# Patient Record
Sex: FEMALE | Race: BLACK OR AFRICAN AMERICAN | NOT HISPANIC OR LATINO | Employment: STUDENT | URBAN - NONMETROPOLITAN AREA
[De-identification: names, ages, dates, MRNs, and addresses within clinical notes are randomized per-mention and may not be internally consistent; named-entity substitution may affect disease eponyms.]

---
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
## 2024-02-08 ENCOUNTER — APPOINTMENT (OUTPATIENT)
Dept: GENERAL RADIOLOGY | Facility: HOSPITAL | Age: 19
End: 2024-02-08
Payer: COMMERCIAL

## 2024-02-08 ENCOUNTER — HOSPITAL ENCOUNTER (EMERGENCY)
Facility: HOSPITAL | Age: 19
Discharge: HOME | End: 2024-02-08
Attending: FAMILY MEDICINE
Payer: COMMERCIAL

## 2024-02-08 VITALS
OXYGEN SATURATION: 99 % | TEMPERATURE: 98.4 F | BODY MASS INDEX: 32.65 KG/M2 | DIASTOLIC BLOOD PRESSURE: 80 MMHG | HEART RATE: 82 BPM | WEIGHT: 208 LBS | SYSTOLIC BLOOD PRESSURE: 122 MMHG | RESPIRATION RATE: 20 BRPM | HEIGHT: 67 IN

## 2024-02-08 DIAGNOSIS — M24.411 SHOULDER DISLOCATION, RECURRENT, RIGHT: Primary | ICD-10-CM

## 2024-02-08 DIAGNOSIS — R07.89 CHEST WALL PAIN: ICD-10-CM

## 2024-02-08 PROCEDURE — 73020 X-RAY EXAM OF SHOULDER: CPT

## 2024-02-08 PROCEDURE — 25810000003 SODIUM CHLORIDE 0.9 % SOLUTION: Performed by: FAMILY MEDICINE

## 2024-02-08 PROCEDURE — 25010000002 PROPOFOL 10 MG/ML EMULSION: Performed by: FAMILY MEDICINE

## 2024-02-08 PROCEDURE — 99285 EMERGENCY DEPT VISIT HI MDM: CPT

## 2024-02-08 PROCEDURE — 73030 X-RAY EXAM OF SHOULDER: CPT

## 2024-02-08 RX ORDER — PROPOFOL 10 MG/ML
VIAL (ML) INTRAVENOUS
Status: COMPLETED | OUTPATIENT
Start: 2024-02-08 | End: 2024-02-08

## 2024-02-08 RX ORDER — KETAMINE HCL IN NACL, ISO-OSM 100MG/10ML
50 SYRINGE (ML) INJECTION ONCE
Status: DISCONTINUED | OUTPATIENT
Start: 2024-02-08 | End: 2024-02-09 | Stop reason: HOSPADM

## 2024-02-08 RX ORDER — NAPROXEN 500 MG/1
500 TABLET ORAL 2 TIMES DAILY WITH MEALS
Qty: 60 TABLET | Refills: 0 | Status: SHIPPED | OUTPATIENT
Start: 2024-02-08

## 2024-02-08 RX ORDER — KETAMINE HYDROCHLORIDE 100 MG/ML
INJECTION, SOLUTION INTRAMUSCULAR; INTRAVENOUS
Status: COMPLETED | OUTPATIENT
Start: 2024-02-08 | End: 2024-02-08

## 2024-02-08 RX ORDER — PROPOFOL 10 MG/ML
50 VIAL (ML) INTRAVENOUS ONCE
Status: DISCONTINUED | OUTPATIENT
Start: 2024-02-08 | End: 2024-02-09 | Stop reason: HOSPADM

## 2024-02-08 RX ORDER — SODIUM CHLORIDE 9 MG/ML
INJECTION, SOLUTION INTRAVENOUS
Status: COMPLETED | OUTPATIENT
Start: 2024-02-08 | End: 2024-02-08

## 2024-02-08 RX ADMIN — SODIUM CHLORIDE 1000 ML: 900 INJECTION, SOLUTION INTRAVENOUS at 21:00

## 2024-02-08 RX ADMIN — PROPOFOL 30 MG: 10 INJECTION, EMULSION INTRAVENOUS at 21:14

## 2024-02-08 RX ADMIN — PROPOFOL 50 MG: 10 INJECTION, EMULSION INTRAVENOUS at 21:10

## 2024-02-08 RX ADMIN — PROPOFOL 20 MG: 10 INJECTION, EMULSION INTRAVENOUS at 21:19

## 2024-02-08 RX ADMIN — KETAMINE HYDROCHLORIDE 50 MG: 100 INJECTION, SOLUTION, CONCENTRATE INTRAMUSCULAR; INTRAVENOUS at 21:25

## 2024-02-08 NOTE — Clinical Note
[facility] EMERGENCY DEPARTMENT  [address] [place] [address] AVE  [facility] [address]  Phone: [phone]    Kavya Martin was seen and treated in our emergency department on 2/8/2024.  She may return to work on 02/10/2024.         Thank you for choosing [facility].    Abdon Butler Jr., MD

## 2024-02-08 NOTE — Clinical Note
[facility] EMERGENCY DEPARTMENT  [address] [place] [address] AVE  [facility] [address]  Phone: [phone]    Kavya Martin was seen and treated in our emergency department on 2/8/2024.  She may return to work on 02/10/2024.         Thank you for choosing [facility].    Abdon Butler Jr., MD

## 2024-02-09 NOTE — DISCHARGE INSTRUCTIONS
Due to your recurrent right shoulder dislocations would recommend calling orthopedic for a evaluation.  You will likely need an MRI arthrogram of your right shoulder to further assess the internal damage of your shoulder.  I do believe you likely have a labral tear as well as some rotator cuff tears.  This may require surgery or may just require significant physical therapy.  To have this determined please call orthopedics tomorrow to schedule an appointment for future date.

## 2024-02-09 NOTE — ED PROVIDER NOTES
HPI:    18-year-old -American female who presents to the emergency room with a right shoulder pain which feels dislocated.  Patient has dislocated it twice in the past but is usually able to get it relocated back at home.  However this time she was trying to punch at her brother and felt a pop and has been unable to get the shoulder back in place.  Patient states that initially went numb but now she is feeling back in her hand can move her hand and wrist with no difficulties.  Current pain in the right shoulder 8-9 out of 10.      REVIEW OF SYSTEMS  CONSTITUTIONAL:  No complaints of fever, chills,or weakness  EYES:  No complaints of discharge   ENT: No complaints of sore throat or ear pain  CARDIOVASCULAR:  No complaints of chest pain, palpitations, or swelling  RESPIRATORY:  No complaints of cough or shortness of breath  GI:  No complaints of abdominal pain, nausea, vomiting, or diarrhea  MUSCULOSKELETAL: Positive for right shoulder pain with likely dislocation.   SKIN:  No complaints of rash  NEUROLOGIC:  No complaints of headache, focal weakness, or sensory changes  ENDOCRINE:  No complaints of polyuria or polydipsia  LYMPHATIC:  No complaints of swollen glands  GENITOURINARY: No complaints of urinary frequency or hematuria        PAST MEDICAL HISTORY  Past Medical History:   Diagnosis Date    IBS (irritable bowel syndrome)        FAMILY HISTORY  History reviewed. No pertinent family history.    SOCIAL HISTORY  Social History     Socioeconomic History    Marital status: Single   Tobacco Use    Smoking status: Never    Smokeless tobacco: Never   Vaping Use    Vaping Use: Unknown   Substance and Sexual Activity    Alcohol use: Defer    Drug use: Defer    Sexual activity: Defer       IMMUNIZATION HISTORY  Deferred to primary care physician.    SURGICAL HISTORY  History reviewed. No pertinent surgical history.    CURRENT MEDICATIONS    Current Facility-Administered Medications:     ketamine (KETALAR)  "injection, , Intravenous, Code / Trauma / Sedation Medication, Abdon Butler Jr., MD, 50 mg at 02/08/24 2125    ketamine hcl syringe solution prefilled syringe 50 mg, 50 mg, Intravenous, Once, Abdon Butler Jr., MD    Propofol (DIPRIVAN) injection 50 mg, 50 mg, Intravenous, Once, Abdon Butler Jr., MD    Propofol (DIPRIVAN) injection, , Intravenous, Code / Trauma / Sedation Medication, Abdon Butler Jr., MD, 20 mg at 02/08/24 2119    sodium chloride 0.9 % bolus 1,000 mL, 1,000 mL, Intravenous, Once, Abdon Butler Jr., MD    sodium chloride 0.9 % infusion, , Intravenous, Code / Trauma / Sedation Continuous Med, Abdon Butler Jr., MD, 1,000 mL at 02/08/24 2100    Current Outpatient Medications:     naproxen (Naprosyn) 500 MG tablet, Take 1 tablet by mouth 2 (Two) Times a Day With Meals., Disp: 60 tablet, Rfl: 0    cyclobenzaprine (FLEXERIL) 10 MG tablet, Take 1 tablet by mouth 3 (Three) Times a Day As Needed for Muscle Spasms., Disp: 15 tablet, Rfl: 0    polyethylene glycol (MIRALAX) powder, Use one capful in 8 oz juice/water daily, Disp: 225 g, Rfl: 2    ALLERGIES  No Known Allergies    Musculoskeletal exam    VITAL SIGNS:   /90 (BP Location: Left arm, Patient Position: Sitting)   Pulse 102 Comment: Sinus tach  Temp 98.4 °F (36.9 °C) (Oral)   Resp 20   Ht 170.2 cm (67\")   Wt 94.3 kg (208 lb)   LMP  (LMP Unknown)   SpO2 99%   BMI 32.58 kg/m²     Constitutional: Patient is alert and in no distress.  Patient with moderate right shoulder discomfort.    Cardiovascular: S1-S2 regular rate and rhythm. No murmurs rubs or gallops are noted.    Respiratory: Patient is clear to auscultation bilaterally with no wheezing or rhonchi.  Chest wall is no.  There are no external lesions on the chest.  There is no crepitance    Abdomen: Soft, nontender. Bowel sounds are normal in all 4 quadrants. There is no rebound or guarding noted.  There is no abdominal distention or " hepatosplenomegaly.      Musculoskeletal/neurological: Right shoulder: There is right anterior fullness noted at the anterior shoulder joint near the axilla.There is a small step-off noted over the area of the deltoid on the right shoulder.  Full range of motion is noted of the distal wrist and hand.  Sensation also intact.        Integumentary: No acute changes noted    Genitourinary: Patient is voiding appropriately.    Psychiatric: Normal affect and mood      RADIOLOGY/PROCEDURES    Verbal consent was obtained for the reduction of the right shoulder dislocation.  With respiratory and 2 nurses at the bedside and a tech patient was sedated with 100 mcg Diprivan and 50 mg of ketamine.  With traction and countertraction patient had right shoulder dislocation reduced.  Patient tolerated well requesting repeat postreduction right shoulder films.    Procedural Sedation Note  Indication: Right shoulder relocation    Consent: Written consent    Physician Involvement: The attending physician was present and supervising this procedure.    Pre-Sedation Documentation and Exam:     Please see history and physical examination for pre-sedation evaluation.    ASA Classification: 2    Sedation Intent: Moderate sedation for reduction of right shoulder dislocation    Monitoring and Safety: The patient was placed on a cardiac monitor and vital signs, pulse oximetry and level of consciousness were continuously evaluated throughout the procedure. The patient was closely monitored until recovery from the medications was complete and the patient had returned to baseline status. Respiratory therapy was on standby at all times during the procedure.    (The following sections must be completed)  Post-Sedation Vital Signs: was stable no hypoxia   Complications: None    Time at bedside:    25 minutes was spent dedicated to the sedation procedure itself, separate from other, separately described, procedures.     XR Shoulder 1 View Right    Final Result   1. Successful reduction of the glenohumeral joint. There is a Hill-Sachs   deformity.       This report was signed and finalized on 2/8/2024 9:38 PM by Dr. Soto Mullins MD.          XR Shoulder 2+ View Right   Final Result   1.. Anterior-inferior dislocation of the right glenohumeral joint.       This report was signed and finalized on 2/8/2024 8:45 PM by Dr. Soto Mullins MD.                 FUTURE APPOINTMENTS     No future appointments.       Radiological test (reviewed and interpreted by me): 2 view of the right shoulder shows a anterior/inferior dislocation of the right humeral head.  No acute fractures are noted.        COURSE & MEDICAL DECISION MAKING    Patient's partial differential diagnosis can include: Right shoulder dislocation, right shoulder strain, rotator cuff tear, labral tear, and other       Right shoulder dislocation is noted on plain film x-ray will get informed consent for reduction of the right shoulder.    Right shoulder was successfully reduced.  Will refer the patient to orthopedics for outpatient follow-up.      Patient's level of risk: low      CRITICAL CARE    CRITICAL CARE: No    CRITICAL CARE TIME: None      No results found for this or any previous visit (from the past 24 hour(s)).        Also  Old charts were reviewed per [facility] EMR.  Pertinent details are summarized above.  All laboratory, radiologic, and EKG studies that were performed in the Emergency Department were a necessary part of the evaluation needed to exclude unstable or  emergent medical conditions.     Patient was hemodynamically and neurologically stable in the ED.   Pertinent studies were reviewed as above.     The patient received:  Medications   Propofol (DIPRIVAN) injection 50 mg (50 mg Intravenous Not Given 2/8/24 2141)   sodium chloride 0.9 % bolus 1,000 mL (1,000 mL Intravenous Not Given 2/8/24 2141)   Propofol (DIPRIVAN) injection (20 mg Intravenous Given 2/8/24 2119)   ketamine hcl  syringe solution prefilled syringe 50 mg (50 mg Intravenous Not Given 2/8/24 2140)   ketamine (KETALAR) injection (50 mg Intravenous Given 2/8/24 2125)   sodium chloride 0.9 % infusion (1,000 mL Intravenous New Bag 2/8/24 2100)            ED Disposition       ED Disposition   Discharge    Condition   Stable    Comment   --                 Dragon disclaimer:  Part of this note may be an electronic transcription/translation of spoken language to printed text using the Dragon Dictation System.     I have reviewed the patient’s prescription history via a prescription monitoring program.  This information is consistent with my knowledge of the patient’s controlled substance use history.    Patient evaluated during Coronavirus Pandemic. Isolation practices followed according to [facility] policy.    FINAL IMPRESSION   Diagnosis Plan   1. Shoulder dislocation, recurrent, right  [facility] 02.  Shoulder Immobilizer/Sling      2. Chest wall pain  naproxen (Naprosyn) 500 MG tablet            MD Luke Condon Jr, Thomas Mark Jr., MD  02/08/24 8581

## 2025-03-10 ENCOUNTER — APPOINTMENT (OUTPATIENT)
Dept: GENERAL RADIOLOGY | Facility: HOSPITAL | Age: 20
End: 2025-03-10
Payer: COMMERCIAL

## 2025-03-10 PROCEDURE — 73564 X-RAY EXAM KNEE 4 OR MORE: CPT

## 2025-03-10 PROCEDURE — 73630 X-RAY EXAM OF FOOT: CPT

## 2025-05-06 ENCOUNTER — APPOINTMENT (OUTPATIENT)
Dept: GENERAL RADIOLOGY | Facility: HOSPITAL | Age: 20
End: 2025-05-06
Payer: COMMERCIAL

## 2025-05-06 PROCEDURE — 73030 X-RAY EXAM OF SHOULDER: CPT

## 2025-06-02 ENCOUNTER — HOSPITAL ENCOUNTER (EMERGENCY)
Facility: HOSPITAL | Age: 20
Discharge: HOME | End: 2025-06-03
Payer: COMMERCIAL

## 2025-06-02 DIAGNOSIS — S43.014A ANTERIOR SHOULDER DISLOCATION, RIGHT, INITIAL ENCOUNTER: Primary | ICD-10-CM

## 2025-06-02 PROCEDURE — 99285 EMERGENCY DEPT VISIT HI MDM: CPT

## 2025-06-03 ENCOUNTER — APPOINTMENT (OUTPATIENT)
Dept: GENERAL RADIOLOGY | Facility: HOSPITAL | Age: 20
End: 2025-06-03
Payer: COMMERCIAL

## 2025-06-03 VITALS
HEART RATE: 89 BPM | BODY MASS INDEX: 34.36 KG/M2 | WEIGHT: 213.8 LBS | TEMPERATURE: 98.4 F | OXYGEN SATURATION: 99 % | DIASTOLIC BLOOD PRESSURE: 79 MMHG | RESPIRATION RATE: 18 BRPM | SYSTOLIC BLOOD PRESSURE: 115 MMHG | HEIGHT: 66 IN

## 2025-06-03 PROCEDURE — 73030 X-RAY EXAM OF SHOULDER: CPT

## 2025-06-03 RX ORDER — SODIUM CHLORIDE 0.9 % (FLUSH) 0.9 %
10 SYRINGE (ML) INJECTION AS NEEDED
Status: DISCONTINUED | OUTPATIENT
Start: 2025-06-03 | End: 2025-06-03 | Stop reason: HOSPADM

## 2025-06-03 RX ORDER — ETOMIDATE 2 MG/ML
0.3 INJECTION INTRAVENOUS ONCE
Status: COMPLETED | OUTPATIENT
Start: 2025-06-03 | End: 2025-06-03

## 2025-06-03 RX ORDER — KETOROLAC TROMETHAMINE 30 MG/ML
30 INJECTION, SOLUTION INTRAMUSCULAR; INTRAVENOUS ONCE
Status: DISCONTINUED | OUTPATIENT
Start: 2025-06-03 | End: 2025-06-03 | Stop reason: HOSPADM

## 2025-06-03 RX ADMIN — ETOMIDATE 30 MG: 40 INJECTION, SOLUTION INTRAVENOUS at 01:19

## 2025-06-03 NOTE — ED PROVIDER NOTES
Subjective   History of Present Illness  States that earlier this evening she stumbled and attempted to catch herself, and in the process injured her right shoulder.  She has a history of dislocating his right shoulder at least 3 times, and states her symptoms currently feel similar to her past dislocation symptoms.  She notes pain diffusely in the right shoulder.  She is unable to move her right upper extremity secondary to pain.  She request something for pain at this time.  She declines all other symptoms at this time.  She does not see an orthopedic physician for her chronic dislocations.          Review of Systems   Musculoskeletal:  Positive for arthralgias and joint swelling.   All other systems reviewed and are negative.      Past Medical History:   Diagnosis Date    IBS (irritable bowel syndrome)        No Known Allergies    No past surgical history on file.    No family history on file.    Social History     Socioeconomic History    Marital status: Single   Tobacco Use    Smoking status: Every Day     Types: Cigarettes    Smokeless tobacco: Never   Vaping Use    Vaping status: Some Days    Substances: Nicotine   Substance and Sexual Activity    Alcohol use: Yes    Drug use: Defer    Sexual activity: Defer           Objective   Physical Exam  Vitals and nursing note reviewed.   Constitutional:       General: She is not in acute distress.     Appearance: Normal appearance. She is obese. She is not ill-appearing, toxic-appearing or diaphoretic.      Comments: Appears uncomfortable.   HENT:      Head: Normocephalic and atraumatic.   Musculoskeletal:         General: Tenderness and deformity present.      Comments: Tenderness to palpation over the right shoulder, specifically over the superior and anterior aspects.  Range of motion restricted secondary to pain.   Skin:     General: Skin is warm and dry.   Neurological:      Mental Status: She is alert.   Psychiatric:         Mood and Affect: Mood normal.          Behavior: Behavior normal.         Thought Content: Thought content normal.         Judgment: Judgment normal.         Upper Extremity Dislocation    Date/Time: 6/3/2025 1:23 AM    Performed by: Stephan Alan PA-C  Authorized by: Stephan Alan PA-C  Consent: Verbal consent obtained. Written consent obtained  Consent given by: patient  Patient understanding: patient states understanding of the procedure being performed  Patient consent: the patient's understanding of the procedure matches consent given  Procedure consent: procedure consent matches procedure scheduled  Relevant documents: relevant documents present and verified  Test results: test results available and properly labeled  Site marked: the operative site was marked  Imaging studies: imaging studies available  Patient identity confirmed: verbally with patient  Injury location: shoulder  Location details: right shoulder  Injury type: dislocation  Dislocation type: anterior  Hill-Sachs deformity: no  Chronicity: recurrent  Pre-procedure distal perfusion: normal  Pre-procedure neurological function: normal  Pre-procedure range of motion: normal    Sedation:  Patient sedated: yes  Sedatives: etomidate  Sedation start date/time: 6/3/2025 1:20 AM  Sedation end date/time: 6/3/2025 1:24 AM  Vitals: Vital signs were monitored during sedation.    Immobilization: splint  Post-procedure distal perfusion: normal  Post-procedure neurological function: normal  Post-procedure range of motion: normal                 ED Course                                                       Medical Decision Making  States that earlier this evening she stumbled and attempted to catch herself, and in the process injured her right shoulder.  She has a history of dislocating his right shoulder at least 3 times, and states her symptoms currently feel similar to her past dislocation symptoms.  She notes pain diffusely in the right shoulder.  She is unable to move her right upper  extremity secondary to pain.  She request something for pain at this time.  She declines all other symptoms at this time.  She does not see an orthopedic physician for her chronic dislocations.    DDX: Dislocation, fracture, contusion, rotator cuff injury.    Images appear to show evidence of mild dislocation/subluxation. Will attempt to reduce via procedural sedation.    The procedure well.  Repeat imaging shows properly placed humeral head in the glenoid fossa.  Sling applied.  Will discharge with a diagnosis of shoulder dislocation, reduced.    Amount and/or Complexity of Data Reviewed  Labs: ordered.  Radiology: ordered.    Risk  Prescription drug management.        Final diagnoses:   Anterior shoulder dislocation, right, initial encounter       ED Disposition  ED Disposition       ED Disposition   Discharge    Condition   Stable    Comment   --               Provider, No Known  [facility] [place] [id number] [phone]    In 1 week  As needed         Medication List      No changes were made to your prescriptions during this visit.            Stephan Alan PA-C  06/03/25 0147

## 2025-06-09 ENCOUNTER — OFFICE VISIT (OUTPATIENT)
Dept: INTERNAL MEDICINE | Facility: CLINIC | Age: 20
End: 2025-06-09
Payer: COMMERCIAL

## 2025-06-09 VITALS
HEART RATE: 102 BPM | DIASTOLIC BLOOD PRESSURE: 75 MMHG | WEIGHT: 217 LBS | OXYGEN SATURATION: 99 % | HEIGHT: 66 IN | RESPIRATION RATE: 16 BRPM | SYSTOLIC BLOOD PRESSURE: 102 MMHG | TEMPERATURE: 96.3 F | BODY MASS INDEX: 34.87 KG/M2

## 2025-06-09 DIAGNOSIS — S43.014A ANTERIOR DISLOCATION OF RIGHT SHOULDER, INITIAL ENCOUNTER: ICD-10-CM

## 2025-06-09 DIAGNOSIS — M24.411 SHOULDER DISLOCATION, RECURRENT, RIGHT: Primary | ICD-10-CM

## 2025-06-09 PROBLEM — E66.9 OBESITY (BMI 35.0-39.9 WITHOUT COMORBIDITY): Status: ACTIVE | Noted: 2025-06-09

## 2025-06-09 PROCEDURE — 1159F MED LIST DOCD IN RCRD: CPT | Performed by: INTERNAL MEDICINE

## 2025-06-09 PROCEDURE — 99213 OFFICE O/P EST LOW 20 MIN: CPT | Performed by: INTERNAL MEDICINE

## 2025-06-09 PROCEDURE — 1160F RVW MEDS BY RX/DR IN RCRD: CPT | Performed by: INTERNAL MEDICINE

## 2025-06-09 RX ORDER — IBUPROFEN 800 MG/1
800 TABLET, FILM COATED ORAL EVERY 6 HOURS PRN
Qty: 90 TABLET | Refills: 2 | Status: SHIPPED | OUTPATIENT
Start: 2025-06-09

## 2025-06-09 NOTE — PROGRESS NOTES
Subjective     Chief Complaint   Patient presents with    Establish Care           Shoulder Injury     right       Shoulder Injury       History of Present Illness  The patient presents for evaluation of shoulder dislocation.    She reports a recurrent issue with her shoulder, which has dislocated four times over the past year. The initial dislocation occurred during a boxing match, where she managed to reposition it herself with the help of friends. Subsequent dislocations also happened during boxing activities, with the second incident requiring an emergency room visit. At that time, she was advised to refrain from further boxing. The third dislocation was severe enough to necessitate sedation for repositioning, and she was informed that surgical intervention might be necessary. The fourth dislocation occurred recently, and she was advised to wear a sling for a week. She has not been wearing the sling for the past two days.    Currently, she is experiencing pain in her shoulder. She does not recall any specific injury to her shoulder prior to these incidents. 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 She is eager to return to work and has been told she can do so on Wednesday if her arm condition improves. She typically uses her left hand for tasks at work and has not experienced any dislocations while working.    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 She lives with her grandmother.      Past Medical History:   Past Medical History:   Diagnosis Date    GERD (gastroesophageal reflux disease)     IBS (irritable bowel syndrome)      Past Surgical History:History reviewed. No pertinent surgical history.  Social History:  reports that she has been smoking cigarettes. She has never used smokeless tobacco. She reports current alcohol use. Drug use questions deferred to the  "physician.    Family History: family history is not on file.      Allergies:  No Known Allergies  Medications:  Prior to Admission medications    Medication Sig Start Date End Date Taking? Authorizing Provider   ibuprofen (ADVIL,MOTRIN) 800 MG tablet Take 1 tablet by mouth 3 (Three) Times a Day With Meals. 3/10/25   Diana Connell APRN   ibuprofen (ADVIL,MOTRIN) 800 MG tablet Take 1 tablet by mouth Every 6 (Six) Hours As Needed for Mild Pain. 6/9/25   Ray Marcelino MD           Review of systems   negative unless otherwise specified above in HPI    Objective     Vital Signs: /75 (BP Location: Left arm, Patient Position: Sitting, Cuff Size: Other (Comment))   Pulse 102   Temp 96.3 °F (35.7 °C) (Temporal)   Resp 16   Ht 167.6 cm (66\")   Wt 98.4 kg (217 lb)   SpO2 99%   BMI 35.02 kg/m²     Physical Exam  Vitals reviewed.   Constitutional:       Appearance: Normal appearance. She is obese.   HENT:      Head: Normocephalic and atraumatic.   Cardiovascular:      Rate and Rhythm: Normal rate and regular rhythm.      Heart sounds: Normal heart sounds.   Pulmonary:      Effort: Pulmonary effort is normal.      Breath sounds: Normal breath sounds.   Abdominal:      General: Bowel sounds are normal.      Palpations: Abdomen is soft.   Musculoskeletal:      Comments: I was careful not to stress her shoulder, in general I think the exam is normal   Neurological:      Mental Status: She is alert.       Physical Exam  Respiratory: Clear to auscultation, no wheezing, rales or rhonchi  Musculoskeletal: Right shoulder shows signs of discomfort with limited range of motion; patient reports pain and a feeling of instability    Class 2 Severe Obesity (BMI >=35 and <=39.9). Obesity-related health conditions include the following: none. Obesity is newly identified. BMI is is above average; BMI management plan is completed. We discussed portion control and increasing exercise.        Results " Reviewed:  Results          Assessment / Plan     Assessment/Plan:   Diagnosis Plan   1. Shoulder dislocation, recurrent, right  Ambulatory Referral to Physical Therapy for Evaluation & Treatment    Ambulatory Referral to Orthopedic Surgery    MRI Shoulder Right Without Contrast    ibuprofen (ADVIL,MOTRIN) 800 MG tablet      2. Anterior dislocation of right shoulder, initial encounter            Assessment & Plan  1. Shoulder dislocation.  - She has experienced recurrent shoulder dislocations, with 4 episodes in the past year.  - The first 3 incidents occurred during boxing activities, and the most recent required sedation for reduction.  - An MRI of the shoulder will be ordered to assess the extent of the injury. A referral to physical therapy will be made to initiate treatment.  - Additionally, a referral to an orthopedic surgeon will be arranged for further evaluation and management, including the potential need for surgical intervention. She is advised to avoid stretching her arm, pulling on objects, and lifting heavy items with the affected arm. The use of a sling is recommended for support and to provide rest to the shoulder.      Return in about 4 weeks (around 7/7/2025). unless patient needs to be seen sooner or acute issues arise.      I have discussed the patient results/orders and and plan/recommendation with them at today's visit.      Signed by:    Dr. Ray Marcelino Date: 06/09/25    EMR Dictation/Transcription disclaimer:   Some of this note may be an electronic transcription/translation of spoken language to printed text. The electronic translation of spoken language may permit erroneous, or at times, nonsensical words or phrases to be inadvertently transcribed; Although I have reviewed the note for such errors, some may still exist.      Patient or patient representative verbalized consent for the use of Ambient Listening during the visit with  Ray Marcelino MD for chart documentation. 6/9/2025   14:15 CDT

## 2025-06-26 ENCOUNTER — HOSPITAL ENCOUNTER (OUTPATIENT)
Dept: MRI IMAGING | Facility: HOSPITAL | Age: 20
Discharge: HOME | End: 2025-06-26
Admitting: INTERNAL MEDICINE
Payer: COMMERCIAL

## 2025-06-26 DIAGNOSIS — M24.411 SHOULDER DISLOCATION, RECURRENT, RIGHT: ICD-10-CM

## 2025-06-26 PROCEDURE — 73221 MRI JOINT UPR EXTREM W/O DYE: CPT

## 2025-07-09 ENCOUNTER — OFFICE VISIT (OUTPATIENT)
Dept: INTERNAL MEDICINE | Facility: CLINIC | Age: 20
End: 2025-07-09
Payer: COMMERCIAL

## 2025-07-09 VITALS
WEIGHT: 221 LBS | RESPIRATION RATE: 16 BRPM | HEART RATE: 94 BPM | OXYGEN SATURATION: 99 % | SYSTOLIC BLOOD PRESSURE: 100 MMHG | DIASTOLIC BLOOD PRESSURE: 68 MMHG | HEIGHT: 66 IN | BODY MASS INDEX: 35.52 KG/M2 | TEMPERATURE: 97.7 F

## 2025-07-09 DIAGNOSIS — S43.014D ANTERIOR DISLOCATION OF RIGHT SHOULDER, SUBSEQUENT ENCOUNTER: Primary | ICD-10-CM

## 2025-07-09 DIAGNOSIS — E66.9 OBESITY (BMI 35.0-39.9 WITHOUT COMORBIDITY): ICD-10-CM

## 2025-07-09 DIAGNOSIS — F41.9 ANXIETY: ICD-10-CM

## 2025-07-09 RX ORDER — TRAZODONE HYDROCHLORIDE 50 MG/1
50 TABLET ORAL NIGHTLY
Qty: 90 TABLET | Refills: 1 | Status: SHIPPED | OUTPATIENT
Start: 2025-07-09

## 2025-07-09 RX ORDER — ESCITALOPRAM OXALATE 10 MG/1
10 TABLET ORAL DAILY
Qty: 30 TABLET | Refills: 1 | Status: SHIPPED | OUTPATIENT
Start: 2025-07-09
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
## 2025-07-10 ENCOUNTER — OFFICE VISIT (OUTPATIENT)
Age: 20
End: 2025-07-10
Payer: MEDICAID

## 2025-07-10 VITALS — HEIGHT: 67 IN | WEIGHT: 221 LBS | BODY MASS INDEX: 34.69 KG/M2

## 2025-07-10 DIAGNOSIS — S43.431A LABRAL TEAR OF SHOULDER, RIGHT, INITIAL ENCOUNTER: ICD-10-CM

## 2025-07-10 DIAGNOSIS — S43.014A ANTERIOR DISLOCATION OF RIGHT SHOULDER, INITIAL ENCOUNTER: ICD-10-CM

## 2025-07-10 DIAGNOSIS — R52 PAIN: Primary | ICD-10-CM

## 2025-07-10 PROCEDURE — G8427 DOCREV CUR MEDS BY ELIG CLIN: HCPCS | Performed by: PHYSICIAN ASSISTANT

## 2025-07-10 PROCEDURE — G8417 CALC BMI ABV UP PARAM F/U: HCPCS | Performed by: PHYSICIAN ASSISTANT

## 2025-07-10 PROCEDURE — 4004F PT TOBACCO SCREEN RCVD TLK: CPT | Performed by: PHYSICIAN ASSISTANT

## 2025-07-10 PROCEDURE — 99204 OFFICE O/P NEW MOD 45 MIN: CPT | Performed by: PHYSICIAN ASSISTANT

## 2025-07-10 RX ORDER — ESCITALOPRAM OXALATE 10 MG/1
10 TABLET ORAL DAILY
COMMUNITY
Start: 2025-07-09

## 2025-07-10 RX ORDER — TRAZODONE HYDROCHLORIDE 50 MG/1
50 TABLET ORAL NIGHTLY
COMMUNITY
Start: 2025-07-09

## 2025-07-10 NOTE — PROGRESS NOTES
Orthopaedic History and Physical - New Patient    NAME:  Marley Jones   : 2005  MRN: [id number]      7/10/2025     CHIEF COMPLAINT: Right shoulder pain status post recent dislocation    HISTORY OF PRESENT ILLNESS:   Is a pleasant 20-year-old female who comes in as a new patient for evaluation of right shoulder pain.  Patient had a recent fall back on .  The fall resulted in a right anterior shoulder dislocation.  This was reduced in the emergency room at [facility].  X-rays were negative.  Patient separately undergone an MRI which did come back showing a labral tear and a Hill-Sachs deformity.  Patient reports her shoulder dislocated 4 times in the last 3 years.  She states the pain is intermittent, sharp, dull and mild to moderate in intensity.  She is here for further evaluation treatment options.  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      Past Medical History:    No past medical history on file.    Past Surgical History:    No past surgical history on file.    Current Medications:   Prior to Admission medications    Medication Sig Start Date End Date Taking? Authorizing Provider   escitalopram (LEXAPRO) 10 MG tablet Take 1 tablet by mouth daily 25  Yes ProviderRachel MD   traZODone (DESYREL) 50 MG tablet Take 1 tablet by mouth nightly 25  Yes ProviderRachel MD       Allergies:  Patient has no known allergies.    Social History:   Social History     Socioeconomic History    Marital status: Unknown     Spouse name: Not on file    Number of children: Not on file    Years of education: Not on file    Highest education level: Not on file   Occupational History    Not on file   Tobacco Use    Smoking status: Not on file    Smokeless tobacco: Not on file   Substance and Sexual Activity    Alcohol use: Not on file    Drug use: Not on file    Sexual activity: Not on file   Other Topics Concern    Not on file   Social History Narrative    Not on file     Social Drivers of

## 2025-08-11 ENCOUNTER — OFFICE VISIT (OUTPATIENT)
Dept: INTERNAL MEDICINE | Facility: CLINIC | Age: 20
End: 2025-08-11
Payer: COMMERCIAL

## 2025-08-11 VITALS
SYSTOLIC BLOOD PRESSURE: 104 MMHG | HEIGHT: 67 IN | HEART RATE: 92 BPM | DIASTOLIC BLOOD PRESSURE: 68 MMHG | WEIGHT: 215 LBS | BODY MASS INDEX: 33.74 KG/M2 | OXYGEN SATURATION: 98 %

## 2025-08-11 DIAGNOSIS — E66.9 OBESITY (BMI 35.0-39.9 WITHOUT COMORBIDITY): ICD-10-CM

## 2025-08-11 DIAGNOSIS — Z11.59 ENCOUNTER FOR HEPATITIS C SCREENING TEST FOR LOW RISK PATIENT: ICD-10-CM

## 2025-08-11 DIAGNOSIS — F41.9 ANXIETY: Primary | ICD-10-CM

## 2025-08-11 DIAGNOSIS — Z00.00 ANNUAL WELLNESS VISIT: ICD-10-CM

## 2025-08-11 DIAGNOSIS — Z72.52 HIGH RISK HOMOSEXUAL BEHAVIOR: ICD-10-CM

## 2025-08-11 RX ORDER — ESCITALOPRAM OXALATE 20 MG/1
20 TABLET ORAL DAILY
Qty: 90 TABLET | Refills: 1 | Status: SHIPPED | OUTPATIENT
Start: 2025-08-11